# Patient Record
(demographics unavailable — no encounter records)

---

## 2024-11-01 NOTE — HISTORY OF PRESENT ILLNESS
[FreeTextEntry1] : Ms. DEAN is an 80-year-old female who returns with regard to a history of type 2 DM. The diabetes has been present since 2004. She denies any history of retinopathy, or nephropathy. With regard to neuropathy, she has noted numbness in her feet. She does not want gabaentin. She is following with a neurologist Dr Palacios.  For the diabetes, she is currently taking Metformin 500mg [1 tab in AM + 2 tabs HS]. Overall, tolerating the medication well.  Home glucose monitoring has shown values of late to be in the 120 range in AM and similar pre dinner.  She does deny any significant hypoglycemic signs and symptoms of late.  POCT A1C returned today at 6%, previously 6.1% in June 2024.  POCT glucose today at 101 mg/dL.  She reports waking up with random blue discoloration on right big toe; denies any injuries or trauma to her foot. Follows podiatrist, Dr. Marin with no immediate concern at this time. She also has pain on tip of 3rd digit on left toe.   She denies polyuria, polydipsia, or any visual changes. She also denies any skin lesions, skin breakdown or non-healing areas of skin. Ophthalmologic evaluation is up to date recently- no diabetic changes noted. Has had cataract surgery b/l around 6 years ago.   ____________________________________________________________________________________________________  Additional medical history includes that of lumbar disc degeneration along with hypertension, hyperlipidemia and hx of lumbar stenosis, PAD, carpal tunnel, hx of cataract surgery. - Hx of headaches. Had w/u including MRI brain neg. Headaches have resolved as of late.  Carotid Doppler showed right carotid 1-15% heterogenous plaque, left carotid 1-15% heterogenous plaque, 30-49% stenosis in right distal femoral artery, 50-74% stenosis in left common femoral artery, bilateral mild to moderate heterogenous plaque in lower extremity system  She had a 3 vessel CABG on 11/02/2022 at [place].  Additional Medications: clonidine 0.1 mg 2 tabs BID, Olmesartan/Amlodipine/HCTZ 40-10-25mg, labetalol 100 mg 3x per day, Rosuvastatin 10 mg, Eliquis 5 mg 2x daily Supplements: vitamin D3 1000 IU, fish oil, coq10 100 mg, centrum silver   PMD Dr. Shane Riley

## 2024-11-01 NOTE — ADDENDUM
[FreeTextEntry1] : POCT glucose testing and Hemoglobin A1c was carried out today given diabetes diagnosis.  This note was written by Lilli Raymond on 11/01/2024 acting as medical scribe for Dr. Glynn Paz. I, Dr. Glynn Paz, have read and attest that all the information, medical decision making and discharge instructions within are true and accurate.

## 2024-11-01 NOTE — HISTORY OF PRESENT ILLNESS
[FreeTextEntry1] : Ms. DEAN is an 80-year-old female who returns with regard to a history of type 2 DM. The diabetes has been present since 2004. She denies any history of retinopathy, or nephropathy. With regard to neuropathy, she has noted numbness in her feet. She does not want gabaentin. She is following with a neurologist Dr Palacios.  For the diabetes, she is currently taking Metformin 500mg [1 tab in AM + 2 tabs HS]. Overall, tolerating the medication well.  Home glucose monitoring has shown values of late to be in the 120 range in AM and similar pre dinner.  She does deny any significant hypoglycemic signs and symptoms of late.  POCT A1C returned today at 6%, previously 6.1% in June 2024.  POCT glucose today at 101 mg/dL.  She reports waking up with random blue discoloration on right big toe; denies any injuries or trauma to her foot. Follows podiatrist, Dr. Marin with no immediate concern at this time. She also has pain on tip of 3rd digit on left toe.   She denies polyuria, polydipsia, or any visual changes. She also denies any skin lesions, skin breakdown or non-healing areas of skin. Ophthalmologic evaluation is up to date recently- no diabetic changes noted. Has had cataract surgery b/l around 6 years ago.   ____________________________________________________________________________________________________  Additional medical history includes that of lumbar disc degeneration along with hypertension, hyperlipidemia and hx of lumbar stenosis, PAD, carpal tunnel, hx of cataract surgery. - Hx of headaches. Had w/u including MRI brain neg. Headaches have resolved as of late.  Carotid Doppler showed right carotid 1-15% heterogenous plaque, left carotid 1-15% heterogenous plaque, 30-49% stenosis in right distal femoral artery, 50-74% stenosis in left common femoral artery, bilateral mild to moderate heterogenous plaque in lower extremity system  She had a 3 vessel CABG on 11/02/2022 at [place].  Additional Medications: clonidine 0.1 mg 2 tabs BID, Olmesartan/Amlodipine/HCTZ 40-10-25mg, labetalol 100 mg 3x per day, Rosuvastatin 10 mg, Eliquis 5 mg 2x daily Supplements: vitamin D3 1000 IU, fish oil, coq10 100 mg, centrum silver   PMD Dr. Shane Riley

## 2024-12-04 NOTE — ASSESSMENT
[FreeTextEntry1] : 80 year old female with low back pain and lumbar radicular pain now returned.  Given the nature of the patient's complaints and lack of significant improvement following more conservative measures, we did discuss lumbar epidural steroid injection.  Risks, benefits, and expectations of the procedure were reviewed including but not limited to bleeding, infection, and nerve damage.  The patient was provided with an educational pamphlet outlining the details of the procedure so that he/she may have the ability to review the information prior to proceeding.  The patient has agreed to proceed and will follow up with me for the procedure.  As she is taking Eliquis, we have placed a call to her cardiologist Dr. Murguia to discuss holding the medication prior to proceeding.

## 2024-12-04 NOTE — HISTORY OF PRESENT ILLNESS
[FreeTextEntry1] : Patient returns after being seen in 2020.  Her last epidural injection was in January of 2020 Left L4-L5 LESI.  She had one epidural injection in August of 2024 with Dr. Alvares which didn't help.

## 2024-12-04 NOTE — DATA REVIEWED
[MRI] : MRI [FreeTextEntry1] : EXAM: [id number] - MR SPINE LUMBAR  - ORDERED BY: GURINDER TERRY   PROCEDURE DATE:  10/23/2024    INTERPRETATION:  CLINICAL INFORMATION: Low back pain  ADDITIONAL CLINICAL INFORMATION: Not Applicable  TECHNIQUE: Multiplanar, multisequence MRI was performed of the lumbar spine. IV Contrast: NONE  PRIOR STUDIES: No priors available for comparison.  FINDINGS:  LOCALIZER: No additional findings. BONES: Moderate L5-S1 endplate degenerative changes with marrow edema compatible with Modic changes. Mild marrow edema at the right L2-L3 and bilateral L3-L4 facet joints. ALIGNMENT: Moderate broad levocurvature. Mild left lateral subluxation of L3 on L4. Grade I anterolisthesis of L4 and L5. Mild retrolisthesis of L5 on S1. SACROILIAC JOINTS/SACRUM: There is no sacral fracture. The SI joints are partially visualized but are intact. CONUS AND CAUDA EQUINA: The distal cord and conus are normal in signal. Conus terminates at L1. VISUALIZED INTRAPELVIC/INTRA-ABDOMINAL SOFT TISSUES: Colonic diverticulosis. Several possible left renal cysts, incompletely evaluated. PARASPINAL SOFT TISSUES: Normal.   INDIVIDUAL LEVELS: Multilevel loss of disc heights and disc signal, severe at L5-S1.  LOWER THORACIC SPINE: No spinal canal or neuroforaminal stenosis.  L1-L2: No spinal canal or neuroforaminal stenosis. L2-L3: Diffuse disc bulge. Mild right facet arthrosis. Mild narrowing of the right lateral recess. Mild right greater than left foraminal narrowing. L3-L4: Diffuse disc bulge. Moderate right facet arthrosis. Ligamentum flavum thickening. Narrowing of the left lateral recess with mild contact on the left L4 descending nerve. Mild central canal narrowing. Moderate to severe right and mild-to-moderate left foraminal narrowing. L4-L5: Grade I anterolisthesis with disc uncovering. Diffuse disc bulge. Bulky left greater than right facet arthrosis with small effusions. Narrowing of the left greater than right lateral recess. Minimal contact on the left L5 descending nerve. Mild central canal narrowing. Mild to moderate bilateral foraminal narrowing. L5-S1: Mild retrolisthesis. Diffuse disc bulge with bulky osseous ridging asymmetric to the left. Moderate left facet arthrosis. Mild narrowing of the right greater than left lateral recess with minimal contact on the right greater than left S1 descending nerves. No significant central canal narrowing. Moderate right and severe left foraminal narrowing. Contact on the L5 exited nerves.   IMPRESSION:  Moderate multilevel lumbar spondylosis with multilevel spinal canal and foraminal narrowing.  --- End of Report ---       TIN RAMSEY MD; Attending Radiologist This document has been electronically signed. Oct 26 2024 10:31AM

## 2024-12-16 NOTE — PROCEDURE
[de-identified] : [facility] [address], [place], [address] - [phone]  PATIENT: PAULA DEAN  MEDICAL RECORD #: DATE OF OPERATION: 12/16/2024  PREOPERATIVE DIAGNOSIS:  LUMBAR RADICULAR PAIN POSTOPERATIVE DIAGNOSIS:  LUMBAR RADICULAR PAIN PROCEDURE: LUMBAR EPIDURAL STEROID INJECTION UNDER X-RAY GUIDANCE SURGEON:  EDITH ALBERT M.D. ANESTHEISA:  LOCAL EBL:  MINIMAL  INDICATIONS:  The patient returns to Pain Management with persistent lower back pain with radiation down the right leg.  The pain has remained quite significant and interferes with the patients ability to perform ADLs despite the implementation of other conservative measures.  I discussed with the patient at length the risks, benefits, and expectations of the aforementioned procedure.  All of the patients questions were answered.  The patient signed informed consent and agreed to proceed.  PROCEDURE:  The patient was transported to the operating room, placed in the prone position and monitored non-invasively with stable vital signs and no complaints.  The patients back was prepped three times with betadine and draped in meticulous sterile fashion.  The L4-L5 interspace was identified fluoroscopically and the skin overlying this level was infiltrated with 5cc of 1% preservative-free lidocaine using a 25 gauge one inch needle.  The epidural space was approached and entered at this level with a 20 gauge Tuohy needle by loss of resistance technique.  Following loss of resistance to air, aspiration was negative for CSF or heme.  Then, 2cc of Omnipaque 240 were injected and the epidurogram revealed spread from L4 to S1 in the posterior epidural space bilaterally with right-sided predominance and no distinct cutoff.  Depo-Medrol 80mg was then injected with 2cc of preservative-free 1% lidocaine.  The needle tract was flushed with 2cc of 1% lidocaine and the needle was removed.  A sterile bandage was applied.  The patient tolerated the procedure well without complaint or complication.  The patient was observed in stable condition after the procedure for approximately 30 minutes before being discharged to home in the company of an adult.  The patient was provided with full written instructions.  The patient will be seen for follow-up in my office in 1 week but certainly sooner with any questions or concerns.    Edith Albert M.D.

## 2025-01-07 NOTE — ASSESSMENT
[FreeTextEntry1] : 80 year old female with low back pain and lumbar radicular pain now moderately improved following her most recent LESI.  She is agreeable to a repeat injection however given her groin pain, she will have a right hip evaluation prior to her subsequent injection.  Risks, benefits, and expectations of the procedure were reviewed including but not limited to bleeding, infection, and nerve damage.  The patient was provided with an educational pamphlet outlining the details of the procedure so that he/she may have the ability to review the information prior to proceeding.  The patient has agreed to proceed and will follow up with me for the procedure.

## 2025-01-07 NOTE — HISTORY OF PRESENT ILLNESS
[FreeTextEntry1] : Patient returns after the most recent LESI a few weeks ago.  The patient reports 50% improvement in the symptoms.  The patient is pleased with the results and returns for a follow up visit today.  The tingling in her right thigh is very bothersome.

## 2025-02-25 NOTE — REASON FOR VISIT
[Follow-Up Visit] : a follow-up visit for [Other: ____] : [unfilled] Nsaids Counseling: NSAID Counseling: I discussed with the patient that NSAIDs should be taken with food. Prolonged use of NSAIDs can result in the development of stomach ulcers.  Patient advised to stop taking NSAIDs if abdominal pain occurs.  The patient verbalized understanding of the proper use and possible adverse effects of NSAIDs.  All of the patient's questions and concerns were addressed.

## 2025-03-01 NOTE — PHYSICAL EXAM
[de-identified] : L spine Skin intact. No TTP of PSIS, paraspinal muscles.  ROM limited in extension with no pain.  Test: Facet loading negative. Straight leg raise negative.  Right Hip:  Skin intact.  No TTP of the ASIS and hipt GT.  FROM with no pain.  Strength: 5/5 HE and KE.  Test: log roll negative, PRITESH/FADIR negative.    right KNee:  Skin intact No TTP of posterior knee.  Knee flexion to 100 deg.  Strength 5/5 KF and KE Test: negative Casper and negative anterior drawer.  shortness of breath

## 2025-03-01 NOTE — PROCEDURE
[de-identified] : Ultrasound Guided Injection  Indication for ultrasound guidance: Ensure placement within the right knee joint  Utlizing the [facility] portable ultrasound machine, the Linear 6cm 13-6 MHz transducer and sterile ultrasound gel, ultrasound guidance with the probe in the short axis, utilizing an in plane approach, was used for the following injection:   Aspiration: RIGHT knee Bakers cyst Indication: Effusion and arthritis  A discussion was had with the patient regarding this procedure and all questions were answered. All risks, benefits and alternatives were discussed. These included but were not limited to bleeding, infection, allergic reaction and reaccumulation of fluid. A timeout was done to ensure correct side and pt agreed to the procedure.  Chlorhexidine was used to sterilize and prep the area in the posterior aspect of the knee. .A 25-gauge needle was used to injection 3mL of 1% lidocaine without epinephrine.  An 18-gauge needle was then used to aspirate cyst and approximately 5 cc of yellow fluid was aspirated from the cyst without complication, the same needle was used to inject 4cc of 1% lidocaine without epinephrine and 1cc of 40mg/ml methylprednisolone into the knee. A sterile bandage was then applied. The patient tolerated the procedure well.

## 2025-03-01 NOTE — HISTORY OF PRESENT ILLNESS
[de-identified] : 79 yo F here today for follow up right hip injection on 2/3. States it has not offered her relief. Has right hip and right knee pain. Pain localized in her right groint and anterior knee. Went to her PCP recently who did an US and she mentions she was found to have a bakers cyst.  Denies radiculoar back pain, but her last epidural was in [facility].

## 2025-03-01 NOTE — ASSESSMENT
[FreeTextEntry1] : 79 yo F presents for follow up of right hip injection on 2/3/25.  Groin pain is better, but continues to complain of radicular pain and knee pain.  I discussed the treatment of degenerative arthritis with the patient at length today. I described the spectrum of treatment from nonoperative modalities to total joint arthroplasty. Noninvasive and nonoperative treatment modalities include weight reduction, activity modification with low impact exercise, PRN use of acetaminophen or anti-inflammatory medication if tolerated, glucosamine/chondroitin supplements, and physical therapy. Further treatments can include corticosteroid injection and the use of hyaluronic acid injections. Definitive treatment can certainly include total joint arthroplasty, but patient would require surgical consultation to discuss that option further. The risks and benefits of each treatment options was discussed and all questions were answered.   1: Right baker cyst aspirated using US guidance and then right knee steroid injection 2: f/u 6 weeks.   Toy PARHAM DO, assisted with documentation on 02/25/2025 acting as scribe for Dr. Sury Rodriguez.  Sury PARHAM MD, EdM, have read and attest that all the information, medical decision making and discharge instructions within are true and accurate

## 2025-03-01 NOTE — ASSESSMENT
[FreeTextEntry1] : 81 yo F presents for follow up of right hip injection on 2/3/25.  Groin pain is better, but continues to complain of radicular pain and knee pain.  I discussed the treatment of degenerative arthritis with the patient at length today. I described the spectrum of treatment from nonoperative modalities to total joint arthroplasty. Noninvasive and nonoperative treatment modalities include weight reduction, activity modification with low impact exercise, PRN use of acetaminophen or anti-inflammatory medication if tolerated, glucosamine/chondroitin supplements, and physical therapy. Further treatments can include corticosteroid injection and the use of hyaluronic acid injections. Definitive treatment can certainly include total joint arthroplasty, but patient would require surgical consultation to discuss that option further. The risks and benefits of each treatment options was discussed and all questions were answered.   1: Right baker cyst aspirated using US guidance and then right knee steroid injection 2: f/u 6 weeks.   Toy PARHAM DO, assisted with documentation on 02/25/2025 acting as scribe for Dr. Sury Rodriguez.  Sury PARHAM MD, EdM, have read and attest that all the information, medical decision making and discharge instructions within are true and accurate

## 2025-03-01 NOTE — PHYSICAL EXAM
[de-identified] : L spine Skin intact. No TTP of PSIS, paraspinal muscles.  ROM limited in extension with no pain.  Test: Facet loading negative. Straight leg raise negative.  Right Hip:  Skin intact.  No TTP of the ASIS and hipt GT.  FROM with no pain.  Strength: 5/5 HE and KE.  Test: log roll negative, PRITESH/FADIR negative.    right KNee:  Skin intact No TTP of posterior knee.  Knee flexion to 100 deg.  Strength 5/5 KF and KE Test: negative Casper and negative anterior drawer.

## 2025-03-01 NOTE — PROCEDURE
[de-identified] : Ultrasound Guided Injection  Indication for ultrasound guidance: Ensure placement within the right knee joint  Utlizing the [facility] portable ultrasound machine, the Linear 6cm 13-6 MHz transducer and sterile ultrasound gel, ultrasound guidance with the probe in the short axis, utilizing an in plane approach, was used for the following injection:   Aspiration: RIGHT knee Bakers cyst Indication: Effusion and arthritis  A discussion was had with the patient regarding this procedure and all questions were answered. All risks, benefits and alternatives were discussed. These included but were not limited to bleeding, infection, allergic reaction and reaccumulation of fluid. A timeout was done to ensure correct side and pt agreed to the procedure.  Chlorhexidine was used to sterilize and prep the area in the posterior aspect of the knee. .A 25-gauge needle was used to injection 3mL of 1% lidocaine without epinephrine.  An 18-gauge needle was then used to aspirate cyst and approximately 5 cc of yellow fluid was aspirated from the cyst without complication, the same needle was used to inject 4cc of 1% lidocaine without epinephrine and 1cc of 40mg/ml methylprednisolone into the knee. A sterile bandage was then applied. The patient tolerated the procedure well.

## 2025-04-10 NOTE — HISTORY OF PRESENT ILLNESS
[FreeTextEntry1] : Ms. DEAN is an 80-year-old female who returns with regard to a history of type 2 DM. The diabetes has been present since 2004. She denies any history of retinopathy, or nephropathy. With regard to neuropathy, she has noted numbness in her feet. She does not want gabaentin. She is following with a neurologist Dr Palacios.  Additional medical history includes that of lumbar disc degeneration along with hypertension, hyperlipidemia and hx of lumbar stenosis, PAD, carpal tunnel, hx of cataract surgery. - Hx of headaches. Had w/u including MRI brain neg. Headaches have resolved as of late. - S/p 3 vessel CABG on 11/02/2022 at [place].  ROS: Has a Baker's cyst causing discomfort behind right knee and has ongoing back pain. Too is feeling exhausted from being unable to sleep at night due to the discomfort and pain. Following with ortho Dr. Sury Rodriguez.  For the diabetes, she is currently taking Metformin 500mg [1 tab in AM + 1 tab HS]. Overall, tolerating the medication well.  Home glucose monitoring has shown values of late to be 104-130 midday and 108-126 in the evening. She does deny any significant hypoglycemic signs and symptoms of late.  POCT A1C returned today at 6.1%. Previously returned at 6% in November 2024. POCT glucose today at 163 mg/dL. -->Had half of a cinnamon & raisin bagel with a piece of a sausage and coffee.  At the last visit, she reported waking up with random blue discoloration on right big toe; denies any injuries or trauma to her foot. Follows podiatrist, Dr. Marin with no immediate concern at this time. She also has pain on tip of 3rd digit on left toe.   She denies polyuria, polydipsia, or any visual changes. She also denies any skin lesions, skin breakdown or non-healing areas of skin. Ophthalmologic evaluation is up to date recently- no diabetic changes noted. Has had cataract surgery b/l around 6 years ago.  ____________________________________________________________________________________________________  Carotid Doppler showed right carotid 1-15% heterogenous plaque, left carotid 1-15% heterogenous plaque, 30-49% stenosis in right distal femoral artery, 50-74% stenosis in left common femoral artery, bilateral mild to moderate heterogenous plaque in lower extremity system  Additional Medications: clonidine 0.1 mg 2 tabs BID, Olmesartan/Amlodipine/HCTZ 40-10-25mg, labetalol 100 mg 3x per day, Rosuvastatin 10 mg, Eliquis 5 mg 2x daily Supplements: vitamin D3 1000 IU, fish oil, coq10 100 mg, centrum silver, one-a-day MV  PMD Dr. Shane Riley Cardiologist Dr. Murguia

## 2025-04-10 NOTE — ADDENDUM
[FreeTextEntry1] : POCT glucose testing and Hemoglobin A1c was carried out today given diabetes diagnosis. Blood will be drawn in office today.  This note was written by Jocelyne Moseley on 04/10/2025 acting as medical scribe for Dr. Glynn Paz. I, Dr. Glynn Paz, have read and attest that all the information, medical decision making and discharge instructions within are true and accurate.

## 2025-04-10 NOTE — HISTORY OF PRESENT ILLNESS
[FreeTextEntry1] : Ms. DEAN is an 80-year-old female who returns with regard to a history of type 2 DM. The diabetes has been present since 2004. She denies any history of retinopathy, or nephropathy. With regard to neuropathy, she has noted numbness in her feet. She does not want gabaentin. She is following with a neurologist Dr Palacios.  Additional medical history includes that of lumbar disc degeneration along with hypertension, hyperlipidemia and hx of lumbar stenosis, PAD, carpal tunnel, hx of cataract surgery. - Hx of headaches. Had w/u including MRI brain neg. Headaches have resolved as of late. - S/p 3 vessel CABG on 11/02/2022 at [facility].  ROS: Has a Baker's cyst causing discomfort behind right knee and has ongoing back pain. Too is feeling exhausted from being unable to sleep at night due to the discomfort and pain. Following with ortho Dr. Sury Rodriguez.  For the diabetes, she is currently taking Metformin 500mg [1 tab in AM + 1 tab HS]. Overall, tolerating the medication well.  Home glucose monitoring has shown values of late to be 104-130 midday and 108-126 in the evening. She does deny any significant hypoglycemic signs and symptoms of late.  POCT A1C returned today at 6.1%. Previously returned at 6% in November 2024. POCT glucose today at 163 mg/dL. -->Had half of a cinnamon & raisin bagel with a piece of a sausage and coffee.  At the last visit, she reported waking up with random blue discoloration on right big toe; denies any injuries or trauma to her foot. Follows podiatrist, Dr. Marin with no immediate concern at this time. She also has pain on tip of 3rd digit on left toe.   She denies polyuria, polydipsia, or any visual changes. She also denies any skin lesions, skin breakdown or non-healing areas of skin. Ophthalmologic evaluation is up to date recently- no diabetic changes noted. Has had cataract surgery b/l around 6 years ago.  ____________________________________________________________________________________________________  Carotid Doppler showed right carotid 1-15% heterogenous plaque, left carotid 1-15% heterogenous plaque, 30-49% stenosis in right distal femoral artery, 50-74% stenosis in left common femoral artery, bilateral mild to moderate heterogenous plaque in lower extremity system  Additional Medications: clonidine 0.1 mg 2 tabs BID, Olmesartan/Amlodipine/HCTZ 40-10-25mg, labetalol 100 mg 3x per day, Rosuvastatin 10 mg, Eliquis 5 mg 2x daily Supplements: vitamin D3 1000 IU, fish oil, coq10 100 mg, centrum silver, one-a-day MV  PMD Dr. Shane Riley Cardiologist Dr. Murguia

## 2025-04-29 NOTE — ASSESSMENT
[FreeTextEntry1] : 80 year old female with low back pain and lumbar radicular pain.  We did discuss repeat lumbar epidural steroid injection.  Risks, benefits, and expectations of the procedure were reviewed including but not limited to bleeding, infection, and nerve damage.  The patient was provided with an educational pamphlet outlining the details of the procedure so that he/she may have the ability to review the information prior to proceeding.  The patient has agreed to proceed and will follow up with me for the procedure.  As she is taking Eliquis, we will await clearance from her cardiologist Dr. Murguia to hold her Eliquis for 3-5 days prior to her procedure.

## 2025-04-29 NOTE — HISTORY OF PRESENT ILLNESS
[FreeTextEntry1] : Patient returns after having her Baker's cyst drained by Dr. Rodriguez which seemed to help.  She still has a "dripping" sensation down her right leg into her calf.  She did have one LESI in December that helped her 50%.  She is here to discuss a repeat injection.

## 2025-05-20 NOTE — ASSESSMENT
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  At this time the patient is essentially asymptomatic in the left lower extremity.  There is no need for urgent intervention.  She is currently on a baby aspirin and rosuvastatin.  She should try to ambulate as frequently as possible however, she is limited by the right knee and I have instructed her to follow-up with orthopedics.  Additionally, patient has a small ascending aortic aneurysm.  If the aneurysm begins to enlarge patient should seek consultation with cardiothoracic surgery.  She will follow-up with me in 6 months and undergo lower extremity arterial Dopplers with exercise.

## 2025-05-20 NOTE — HISTORY OF PRESENT ILLNESS
[FreeTextEntry1] : 80-year-old female non-smoker with a history of hypertension, hyperlipidemia, diabetes, coronary artery disease and a known small ascending aortic aneurysm recently underwent a CT scan which showed a left iliac artery occlusion.  Patient with a long history of lumbar spinal stenosis with right leg sciatica and arthritis.  She is status post a left total knee replacement.  Patient has been complaining of severe right knee pain which has necessitated drainage of a Baker's cyst x 2.  Patient states she has no pain in the left foot.  The left leg is not limiting her ambulation.  There is no buttock claudication.  She presents for evaluation.

## 2025-05-20 NOTE — PHYSICAL EXAM
[JVD] : no jugular venous distention  [Normal Breath Sounds] : Normal breath sounds [Normal Rate and Rhythm] : normal rate and rhythm [2+] : right 2+ [0] : left 0 [Ankle Swelling (On Exam)] : not present [Varicose Veins Of Lower Extremities] : not present [] : not present [Abdomen Tenderness] : ~T ~M No abdominal tenderness [No Rash or Lesion] : No rash or lesion [Alert] : alert [Calm] : calm [de-identified] : Appears well

## 2025-06-23 NOTE — PROCEDURE
[de-identified] : [facility] [address], [place], [address] - [phone]  PATIENT: PAULA DEAN  MEDICAL RECORD #: DATE OF OPERATION: 06/23/2025  PREOPERATIVE DIAGNOSIS:  LUMBAR RADICULAR PAIN POSTOPERATIVE DIAGNOSIS:  LUMBAR RADICULAR PAIN PROCEDURE: LUMBAR EPIDURAL STEROID INJECTION UNDER X-RAY GUIDANCE SURGEON:  EDITH ALBERT M.D. ANESTHEISA:  LOCAL EBL:  MINIMAL  INDICATIONS:  The patient returns to Pain Management with persistent lower back pain with radiation down the right leg.  The pain has remained quite significant and interferes with the patients ability to perform ADLs despite the implementation of other conservative measures.  I discussed with the patient at length the risks, benefits, and expectations of the aforementioned procedure.  All of the patients questions were answered.  The patient signed informed consent and agreed to proceed.  PROCEDURE:  The patient was transported to the operating room, placed in the prone position and monitored non-invasively with stable vital signs and no complaints.  The patients back was prepped three times with betadine and draped in meticulous sterile fashion.  The L4-L5 interspace was identified fluoroscopically and the skin overlying this level was infiltrated with 5cc of 1% preservative-free lidocaine using a 25 gauge one inch needle.  The epidural space was approached and entered at this level with a 20 gauge Tuohy needle by loss of resistance technique.  Following loss of resistance to air, aspiration was negative for CSF or heme.  Then, 2cc of Omnipaque 240 were injected and the epidurogram revealed spread from L4 to S1 in the posterior epidural space bilaterally with right-sided predominance and no distinct cutoff.  Depo-Medrol 80mg was then injected with 2cc of preservative-free 1% lidocaine.  The needle tract was flushed with 2cc of 1% lidocaine and the needle was removed.  A sterile bandage was applied.  The patient tolerated the procedure well without complaint or complication.  The patient was observed in stable condition after the procedure for approximately 30 minutes before being discharged to home in the company of an adult.  The patient was provided with full written instructions.  The patient will be seen for follow-up in my office in 1 week but certainly sooner with any questions or concerns.    Edith Albert M.D.

## 2025-07-16 NOTE — HISTORY OF PRESENT ILLNESS
[FreeTextEntry1] : Patient returns after the most LESI a few weeks ago.  The patient reports 70-75% improvement in the symptoms.  The patient is pleased with the results and returns for a follow up visit today.